# Patient Record
Sex: MALE | Race: BLACK OR AFRICAN AMERICAN | NOT HISPANIC OR LATINO | Employment: PART TIME | URBAN - METROPOLITAN AREA
[De-identification: names, ages, dates, MRNs, and addresses within clinical notes are randomized per-mention and may not be internally consistent; named-entity substitution may affect disease eponyms.]

---

## 2022-06-15 ENCOUNTER — HOSPITAL ENCOUNTER (EMERGENCY)
Facility: CLINIC | Age: 42
Discharge: ANOTHER HOSPITAL | End: 2022-06-16
Attending: EMERGENCY MEDICINE | Admitting: EMERGENCY MEDICINE

## 2022-06-15 DIAGNOSIS — T78.2XXA ANAPHYLAXIS, INITIAL ENCOUNTER: ICD-10-CM

## 2022-06-15 DIAGNOSIS — F41.9 ANXIETY: ICD-10-CM

## 2022-06-15 PROCEDURE — 99285 EMERGENCY DEPT VISIT HI MDM: CPT | Mod: 25

## 2022-06-15 PROCEDURE — 250N000011 HC RX IP 250 OP 636: Performed by: EMERGENCY MEDICINE

## 2022-06-15 PROCEDURE — 96374 THER/PROPH/DIAG INJ IV PUSH: CPT

## 2022-06-15 PROCEDURE — 250N000009 HC RX 250

## 2022-06-15 PROCEDURE — 90791 PSYCH DIAGNOSTIC EVALUATION: CPT

## 2022-06-15 PROCEDURE — C9803 HOPD COVID-19 SPEC COLLECT: HCPCS

## 2022-06-15 PROCEDURE — 96375 TX/PRO/DX INJ NEW DRUG ADDON: CPT

## 2022-06-15 RX ORDER — METHYLPREDNISOLONE SODIUM SUCCINATE 125 MG/2ML
125 INJECTION, POWDER, LYOPHILIZED, FOR SOLUTION INTRAMUSCULAR; INTRAVENOUS ONCE
Status: COMPLETED | OUTPATIENT
Start: 2022-06-15 | End: 2022-06-15

## 2022-06-15 RX ORDER — LORAZEPAM 2 MG/ML
1 INJECTION INTRAMUSCULAR ONCE
Status: COMPLETED | OUTPATIENT
Start: 2022-06-15 | End: 2022-06-15

## 2022-06-15 RX ADMIN — METHYLPREDNISOLONE SODIUM SUCCINATE 125 MG: 125 INJECTION, POWDER, FOR SOLUTION INTRAMUSCULAR; INTRAVENOUS at 22:25

## 2022-06-15 RX ADMIN — EPINEPHRINE 0.3 MG: 1 INJECTION INTRAMUSCULAR; INTRAVENOUS; SUBCUTANEOUS at 22:22

## 2022-06-15 RX ADMIN — LORAZEPAM 1 MG: 2 INJECTION INTRAMUSCULAR; INTRAVENOUS at 23:26

## 2022-06-16 ENCOUNTER — TELEPHONE (OUTPATIENT)
Dept: BEHAVIORAL HEALTH | Facility: CLINIC | Age: 42
End: 2022-06-16

## 2022-06-16 ENCOUNTER — HOSPITAL ENCOUNTER (INPATIENT)
Facility: HOSPITAL | Age: 42
LOS: 1 days | Discharge: NURSING, REHAB OR LONG-TERM CARE | DRG: 885 | End: 2022-06-17
Attending: NURSE PRACTITIONER | Admitting: STUDENT IN AN ORGANIZED HEALTH CARE EDUCATION/TRAINING PROGRAM

## 2022-06-16 VITALS
BODY MASS INDEX: 30.48 KG/M2 | TEMPERATURE: 97.9 F | OXYGEN SATURATION: 99 % | HEART RATE: 130 BPM | HEIGHT: 73 IN | SYSTOLIC BLOOD PRESSURE: 148 MMHG | DIASTOLIC BLOOD PRESSURE: 107 MMHG | RESPIRATION RATE: 18 BRPM | WEIGHT: 230 LBS

## 2022-06-16 PROBLEM — R44.0 AUDITORY HALLUCINATIONS: Status: ACTIVE | Noted: 2022-06-16

## 2022-06-16 LAB
AMPHETAMINES UR QL SCN: ABNORMAL
ANION GAP SERPL CALCULATED.3IONS-SCNC: 14 MMOL/L (ref 5–18)
BARBITURATES UR QL: ABNORMAL
BASOPHILS # BLD AUTO: 0 10E3/UL (ref 0–0.2)
BASOPHILS NFR BLD AUTO: 0 %
BENZODIAZ UR QL: ABNORMAL
BUN SERPL-MCNC: 12 MG/DL (ref 8–22)
CALCIUM SERPL-MCNC: 9.6 MG/DL (ref 8.5–10.5)
CANNABINOIDS UR QL SCN: ABNORMAL
CHLORIDE BLD-SCNC: 100 MMOL/L (ref 98–107)
CO2 SERPL-SCNC: 22 MMOL/L (ref 22–31)
COCAINE UR QL: ABNORMAL
CREAT SERPL-MCNC: 1.36 MG/DL (ref 0.7–1.3)
CREAT UR-MCNC: 147 MG/DL
EOSINOPHIL # BLD AUTO: 0 10E3/UL (ref 0–0.7)
EOSINOPHIL NFR BLD AUTO: 0 %
ERYTHROCYTE [DISTWIDTH] IN BLOOD BY AUTOMATED COUNT: 16.2 % (ref 10–15)
GFR SERPL CREATININE-BSD FRML MDRD: 68 ML/MIN/1.73M2
GLUCOSE BLD-MCNC: 129 MG/DL (ref 70–125)
HCT VFR BLD AUTO: 45.3 % (ref 40–53)
HGB BLD-MCNC: 14.7 G/DL (ref 13.3–17.7)
IMM GRANULOCYTES # BLD: 0.1 10E3/UL
IMM GRANULOCYTES NFR BLD: 1 %
LYMPHOCYTES # BLD AUTO: 1.2 10E3/UL (ref 0.8–5.3)
LYMPHOCYTES NFR BLD AUTO: 9 %
MCH RBC QN AUTO: 27.1 PG (ref 26.5–33)
MCHC RBC AUTO-ENTMCNC: 32.5 G/DL (ref 31.5–36.5)
MCV RBC AUTO: 83 FL (ref 78–100)
MONOCYTES # BLD AUTO: 0.3 10E3/UL (ref 0–1.3)
MONOCYTES NFR BLD AUTO: 2 %
NEUTROPHILS # BLD AUTO: 12.1 10E3/UL (ref 1.6–8.3)
NEUTROPHILS NFR BLD AUTO: 88 %
NRBC # BLD AUTO: 0 10E3/UL
NRBC BLD AUTO-RTO: 0 /100
OPIATES UR QL SCN: ABNORMAL
OXYCODONE UR QL: ABNORMAL
PCP UR QL SCN: ABNORMAL
PLATELET # BLD AUTO: 385 10E3/UL (ref 150–450)
POTASSIUM BLD-SCNC: 4.1 MMOL/L (ref 3.5–5)
RBC # BLD AUTO: 5.43 10E6/UL (ref 4.4–5.9)
SARS-COV-2 RNA RESP QL NAA+PROBE: NEGATIVE
SODIUM SERPL-SCNC: 136 MMOL/L (ref 136–145)
WBC # BLD AUTO: 13.6 10E3/UL (ref 4–11)

## 2022-06-16 PROCEDURE — 85025 COMPLETE CBC W/AUTO DIFF WBC: CPT | Performed by: EMERGENCY MEDICINE

## 2022-06-16 PROCEDURE — U0003 INFECTIOUS AGENT DETECTION BY NUCLEIC ACID (DNA OR RNA); SEVERE ACUTE RESPIRATORY SYNDROME CORONAVIRUS 2 (SARS-COV-2) (CORONAVIRUS DISEASE [COVID-19]), AMPLIFIED PROBE TECHNIQUE, MAKING USE OF HIGH THROUGHPUT TECHNOLOGIES AS DESCRIBED BY CMS-2020-01-R: HCPCS | Performed by: EMERGENCY MEDICINE

## 2022-06-16 PROCEDURE — 250N000013 HC RX MED GY IP 250 OP 250 PS 637: Performed by: NURSE PRACTITIONER

## 2022-06-16 PROCEDURE — 250N000013 HC RX MED GY IP 250 OP 250 PS 637: Performed by: EMERGENCY MEDICINE

## 2022-06-16 PROCEDURE — 36415 COLL VENOUS BLD VENIPUNCTURE: CPT | Performed by: EMERGENCY MEDICINE

## 2022-06-16 PROCEDURE — 80048 BASIC METABOLIC PNL TOTAL CA: CPT | Performed by: EMERGENCY MEDICINE

## 2022-06-16 PROCEDURE — 80307 DRUG TEST PRSMV CHEM ANLYZR: CPT | Performed by: EMERGENCY MEDICINE

## 2022-06-16 PROCEDURE — 124N000001 HC R&B MH

## 2022-06-16 PROCEDURE — 250N000012 HC RX MED GY IP 250 OP 636 PS 637: Performed by: EMERGENCY MEDICINE

## 2022-06-16 RX ORDER — NICOTINE 21 MG/24HR
1 PATCH, TRANSDERMAL 24 HOURS TRANSDERMAL DAILY
Status: DISCONTINUED | OUTPATIENT
Start: 2022-06-17 | End: 2022-06-16

## 2022-06-16 RX ORDER — OLANZAPINE 10 MG/1
10 TABLET ORAL 3 TIMES DAILY PRN
Status: DISCONTINUED | OUTPATIENT
Start: 2022-06-16 | End: 2022-06-17 | Stop reason: HOSPADM

## 2022-06-16 RX ORDER — FOLIC ACID 1 MG/1
1 TABLET ORAL DAILY
Status: DISCONTINUED | OUTPATIENT
Start: 2022-06-16 | End: 2022-06-17 | Stop reason: HOSPADM

## 2022-06-16 RX ORDER — LORAZEPAM 2 MG/ML
1-2 INJECTION INTRAMUSCULAR EVERY 30 MIN PRN
Status: DISCONTINUED | OUTPATIENT
Start: 2022-06-16 | End: 2022-06-17 | Stop reason: HOSPADM

## 2022-06-16 RX ORDER — CLONIDINE HYDROCHLORIDE 0.1 MG/1
0.1 TABLET ORAL 2 TIMES DAILY PRN
Status: DISCONTINUED | OUTPATIENT
Start: 2022-06-16 | End: 2022-06-17 | Stop reason: HOSPADM

## 2022-06-16 RX ORDER — MAGNESIUM HYDROXIDE/ALUMINUM HYDROXICE/SIMETHICONE 120; 1200; 1200 MG/30ML; MG/30ML; MG/30ML
30 SUSPENSION ORAL EVERY 4 HOURS PRN
Status: DISCONTINUED | OUTPATIENT
Start: 2022-06-16 | End: 2022-06-17 | Stop reason: HOSPADM

## 2022-06-16 RX ORDER — HYDROCODONE BITARTRATE AND ACETAMINOPHEN 5; 325 MG/1; MG/1
2 TABLET ORAL ONCE
Status: COMPLETED | OUTPATIENT
Start: 2022-06-16 | End: 2022-06-16

## 2022-06-16 RX ORDER — POLYETHYLENE GLYCOL 3350 17 G
2 POWDER IN PACKET (EA) ORAL
Status: DISCONTINUED | OUTPATIENT
Start: 2022-06-16 | End: 2022-06-17 | Stop reason: HOSPADM

## 2022-06-16 RX ORDER — ACETAMINOPHEN 325 MG/1
650 TABLET ORAL EVERY 4 HOURS PRN
Status: DISCONTINUED | OUTPATIENT
Start: 2022-06-16 | End: 2022-06-16

## 2022-06-16 RX ORDER — LORAZEPAM 1 MG/1
1-2 TABLET ORAL EVERY 30 MIN PRN
Status: DISCONTINUED | OUTPATIENT
Start: 2022-06-16 | End: 2022-06-17 | Stop reason: HOSPADM

## 2022-06-16 RX ORDER — LIDOCAINE 4 G/G
2 PATCH TOPICAL
Status: DISCONTINUED | OUTPATIENT
Start: 2022-06-16 | End: 2022-06-17 | Stop reason: HOSPADM

## 2022-06-16 RX ORDER — EPINEPHRINE 0.3 MG/.3ML
0.3 INJECTION SUBCUTANEOUS
Qty: 1 EACH | Refills: 0 | Status: SHIPPED | OUTPATIENT
Start: 2022-06-16

## 2022-06-16 RX ORDER — AMOXICILLIN 250 MG
1 CAPSULE ORAL 2 TIMES DAILY PRN
Status: DISCONTINUED | OUTPATIENT
Start: 2022-06-16 | End: 2022-06-17 | Stop reason: HOSPADM

## 2022-06-16 RX ORDER — LORAZEPAM 1 MG/1
1 TABLET ORAL ONCE
Status: COMPLETED | OUTPATIENT
Start: 2022-06-16 | End: 2022-06-16

## 2022-06-16 RX ORDER — PREDNISONE 10 MG/1
TABLET ORAL
Qty: 30 TABLET | Refills: 0 | Status: SHIPPED | OUTPATIENT
Start: 2022-06-16 | End: 2022-06-26

## 2022-06-16 RX ORDER — HYDROXYZINE HYDROCHLORIDE 25 MG/1
50 TABLET, FILM COATED ORAL EVERY 4 HOURS PRN
Status: DISCONTINUED | OUTPATIENT
Start: 2022-06-16 | End: 2022-06-17 | Stop reason: HOSPADM

## 2022-06-16 RX ORDER — LANOLIN ALCOHOL/MO/W.PET/CERES
6 CREAM (GRAM) TOPICAL
Status: DISCONTINUED | OUTPATIENT
Start: 2022-06-16 | End: 2022-06-17 | Stop reason: HOSPADM

## 2022-06-16 RX ORDER — FLUMAZENIL 0.1 MG/ML
0.2 INJECTION, SOLUTION INTRAVENOUS
Status: DISCONTINUED | OUTPATIENT
Start: 2022-06-16 | End: 2022-06-17 | Stop reason: HOSPADM

## 2022-06-16 RX ORDER — NICOTINE 21 MG/24HR
1 PATCH, TRANSDERMAL 24 HOURS TRANSDERMAL DAILY
Status: DISCONTINUED | OUTPATIENT
Start: 2022-06-16 | End: 2022-06-17 | Stop reason: HOSPADM

## 2022-06-16 RX ORDER — MULTIPLE VITAMINS W/ MINERALS TAB 9MG-400MCG
1 TAB ORAL DAILY
Status: DISCONTINUED | OUTPATIENT
Start: 2022-06-16 | End: 2022-06-17 | Stop reason: HOSPADM

## 2022-06-16 RX ORDER — OLANZAPINE 10 MG/2ML
10 INJECTION, POWDER, FOR SOLUTION INTRAMUSCULAR 3 TIMES DAILY PRN
Status: DISCONTINUED | OUTPATIENT
Start: 2022-06-16 | End: 2022-06-17 | Stop reason: HOSPADM

## 2022-06-16 RX ORDER — ACETAMINOPHEN 325 MG/1
975 TABLET ORAL EVERY 6 HOURS PRN
Status: DISCONTINUED | OUTPATIENT
Start: 2022-06-16 | End: 2022-06-17 | Stop reason: HOSPADM

## 2022-06-16 RX ADMIN — OLANZAPINE 10 MG: 10 TABLET, FILM COATED ORAL at 21:44

## 2022-06-16 RX ADMIN — LIDOCAINE 2 PATCH: 4 PATCH TOPICAL at 21:53

## 2022-06-16 RX ADMIN — LORAZEPAM 2 MG: 1 TABLET ORAL at 21:44

## 2022-06-16 RX ADMIN — NICOTINE 1 PATCH: 21 PATCH, EXTENDED RELEASE TRANSDERMAL at 22:49

## 2022-06-16 RX ADMIN — HYDROCODONE BITARTRATE AND ACETAMINOPHEN 2 TABLET: 5; 325 TABLET ORAL at 07:48

## 2022-06-16 RX ADMIN — PREDNISONE 50 MG: 20 TABLET ORAL at 07:48

## 2022-06-16 RX ADMIN — LORAZEPAM 1 MG: 1 TABLET ORAL at 02:58

## 2022-06-16 RX ADMIN — ACETAMINOPHEN 975 MG: 325 TABLET ORAL at 21:44

## 2022-06-16 ASSESSMENT — ACTIVITIES OF DAILY LIVING (ADL)
TOILETING_ISSUES: NO
DRESSING/BATHING_DIFFICULTY: NO
WEAR_GLASSES_OR_BLIND: NO
DIFFICULTY_EATING/SWALLOWING: NO
CONCENTRATING,_REMEMBERING_OR_MAKING_DECISIONS_DIFFICULTY: OTHER (SEE COMMENTS)
CHANGE_IN_FUNCTIONAL_STATUS_SINCE_ONSET_OF_CURRENT_ILLNESS/INJURY: NO
FALL_HISTORY_WITHIN_LAST_SIX_MONTHS: NO
WALKING_OR_CLIMBING_STAIRS_DIFFICULTY: NO
DOING_ERRANDS_INDEPENDENTLY_DIFFICULTY: OTHER (SEE COMMENTS)

## 2022-06-16 NOTE — TELEPHONE ENCOUNTER
Inpatient Bed Call Log 6/16/2022 Morning done at 8:06a    Adults:         Not [facility]; Outside [facility] is [facility] beds.     [facility] is posting 0 beds.    [facility] is posting 0 beds.    [facility] is posting 0 beds.    [facility] is posting 0 beds.    [facility] is posting 0 beds.    [facility] is posting 0 beds.    [facility] is posting 0 beds.      [facility] is posting 3 beds. Mixed unit 12+. Low acuity only.     [facility] is positing 0 beds. No aggression.     [facility] is posting 0 beds.     [facility] is posting 0 beds. Low acuity only.    [facility] is posting 0 beds.    [facility] is posting 0 beds. Low acuity.     [facility] is posting 0 beds. 72 hr hold required.     [facility] is posting 6 beds.       [facility] is posting 0 beds. Vol only, No Hx of aggression, violence or assault. No sexual offenders. No 72 hr holds.    [facility] is posting 8 beds. (Must have the cognitive ability to do programming. No aggressive or violent behavior or recent HX in the last 2 yrs. MH must be primary.)    [facility] is posting 0 beds. Low acuity only. Violence and aggression capped.     [facility] is posting 0 beds. Low acuity, Neg Covid.     [facility] is posting 0 beds. Covid neg. Vol only. Combined adolescent and adult unit. No aggressive or violent behavior. No registered sex offenders.     [facility] is posting 6 beds. Call for details.      [facility] is posting 2 beds.    9:30am No appropriate beds available.     R: Patient cleared and ready for behavioral bed placement: Yes    10:34am Intake called [facility] and spoke with pt's nurse (Cheryl). Intake requested CBC and CMP labs for outside placement. Nurse reports pt is calm, cooperative, not aggressive, and is wanting to restart his MH meds.     1:31pm Intake called [facility]  provider April. Pt was accepted for placement at [facility].     1:52pm Intake called [address] and provided disposition to charge nurse (Valarie). Nurse report: Anytime.    1:55pm Intake called [facility] ED and provided placement information to pt's nurse.

## 2022-06-16 NOTE — ED PROVIDER NOTES
EMERGENCY DEPARTMENT ENCOUNTER      NAME: Corbin Sheets  AGE: 41 year old male  YOB: 1980  MRN: [id number]  EVALUATION DATE & TIME: 6/15/2022 10:10 PM    PCP: No primary care provider on file.    ED PROVIDER: Richie Robert D.O.      Chief Complaint   Patient presents with     Allergic Reaction       FINAL IMPRESSION:  1. Anaphylaxis, initial encounter    2. Anxiety        ED COURSE & MEDICAL DECISION MAKING:    10:20 PM I met with the patient to gather history and to perform my initial exam. I discussed the plan for care while in the Emergency Department.  11:21 PM I rechecked patient. Patient's throat is feeling improved. He is still feeling somewhat anxious. Will give patient Ativan for this.   1:11 AM I spoke to DEC .  1:22 AM Patient is now medically cleared for behavioral health admission.         Pertinent Labs & Imaging studies reviewed. (See chart for details)  41 year old male presents to the Emergency Department for evaluation of apparent anaphylactic reaction after taking naproxen this evening.  Patient did have significant edema of the soft palate and uvula on exam which did improve after epinephrine and Solu-Medrol.  The patient did receive Benadryl prior to arriving to the emergency department.  Patient was stable for 3 full hours, and then medically cleared.  He did report that he was having significant anxiety before the onset of the anaphylactic reaction, and after discussion with DEC, we have decided that the patient would benefit from inpatient behavioral health management.  He is voluntary, I do not believe him to be holdable, as I do not believe him to be a significant risk to himself or others, but do believe he would benefit from inpatient management.  At this time the patient is otherwise stable, and will remain in the emergency department until able to find a bed by social work.    At the conclusion of the encounter I discussed the results of all of the tests and the  disposition. The questions were answered. The patient or family acknowledged understanding and was agreeable with the care plan.      CRITICAL CARE:  Critical Care  Performed by: SUPRIYA CISNEROS  Authorized by: SUPRIYA CISNEROS  Total critical care time: 35 minutes  Critical care time was exclusive of separately billable procedures and treating other patients.  Critical care was necessary to treat or prevent imminent or life-threatening deterioration of the following conditions: anaphylaxis  Critical care was time spent personally by me on the following activities: development of treatment plan with patient or surrogate, discussions with consultants, examination of patient, evaluation of patient's response to treatment, obtaining history from patient or surrogate, ordering and performing treatments and interventions, ordering and review of laboratory studies, ordering and review of radiographic studies and re-evaluation of patient's condition, this excludes any separately billable procedures.      HPI    Patient information was obtained from: Patient and Nursing Report    Use of : N/A      Corbin Sheets is a 41 year old male with no pertinent history who presents to the ED via walk-in for the evaluation of allergic reaction.    Per nursing report, patient took 1000 mg Naproxen about an hour ago for the first time. Afterwards,  patient had some chest pain, shortness of breath, oral swelling, and itching. Patient received 50 mg Benadryl orally at a [facility] pharmacy and 25 IM Benadryl en route to ED per EMS. No epinephrine given.    At present, patient reports throat swelling, chest pain, shortness of breath, and itchy sensation to entire body. He also mentions he has felt more anxious lately. Otherwise, he denies any nausea, vomiting, fever, diarrhea, urinary symptoms, and suicidal ideation. He is currently on Ativan for anxiety. He denies any other significant medical history or regular medications. No  "previous surgeries. Patient does smoke and drink. No other complaints at this time.    REVIEW OF SYSTEMS  Constitutional:  Denies fever, chills, weight loss or weakness  Eyes:  No pain, discharge, redness  HENT:  Denies sore throat, ear pain, congestion. Positive for swelling to throat               Respiratory: No wheeze or cough . Positive for shortness of breath  Cardiovascular:  No palpitations. Positive for chest pain  GI:  Denies abdominal pain, nausea, vomiting, diarrhea  : Denies dysuria, hematuria  Musculoskeletal:  Denies any new muscle/joint pain, swelling or loss of function.  Skin:  Denies rash, pallor  Neurologic:  Denies headache, focal weakness or sensory changes  Lymph: Denies swollen nodes   Psychiatric: Positive for feeling anxious               All other systems negative unless noted in HPI.    PAST MEDICAL HISTORY:  History reviewed. No pertinent past medical history.    PAST SURGICAL HISTORY:  History reviewed. No pertinent surgical history.      CURRENT MEDICATIONS:    Current Facility-Administered Medications   Medication     predniSONE (DELTASONE) tablet 50 mg     Current Outpatient Medications   Medication     EPINEPHrine (ANY BX GENERIC EQUIV) 0.3 MG/0.3ML injection 2-pack     predniSONE (DELTASONE) 10 MG tablet         ALLERGIES:  No Known Allergies    FAMILY HISTORY:  History reviewed. No pertinent family history.    SOCIAL HISTORY:       VITALS:  Patient Vitals for the past 24 hrs:   BP Temp Temp src Pulse Resp SpO2 Height Weight   06/15/22 2345 (!) 200/95 -- -- 117 18 99 % -- --   06/15/22 2330 (!) 180/77 -- -- 118 18 98 % -- --   06/15/22 2315 (!) 229/118 -- -- (!) 125 18 98 % -- --   06/15/22 2300 (!) 206/88 -- -- (!) 131 18 98 % -- --   06/15/22 2230 (!) 176/94 -- -- (!) 129 20 98 % -- --   06/15/22 2215 (!) 187/102 -- -- -- -- -- -- --   06/15/22 2211 (!) 200/119 98.3  F (36.8  C) Oral (!) 127 22 99 % 1.854 m (6' 1\") 104.3 kg (230 lb)       PHYSICAL EXAM    VITAL SIGNS: BP (!) " "200/95   Pulse 117   Temp 98.3  F (36.8  C) (Oral)   Resp 18   Ht 1.854 m (6' 1\")   Wt 104.3 kg (230 lb)   SpO2 99%   BMI 30.34 kg/m      General Appearance: Appears anxious, well-nourished, no acute distress   Head:  Normocephalic, without obvious abnormality, atraumatic  Eyes:  PERRL, conjunctiva/corneas clear, EOM's intact,  ENT:  Lips, mucosa, and tongue normal, membranes are moist without pallor. Moderate edema to soft palate and uvula  Neck:  Normal ROM, symmetrical, trachea midline    Cardio:  Regular rate and rhythm, no murmur, rub or gallop, 2+ pulses symmetric in all extremities  Pulm:  Clear to auscultation bilaterally, respirations unlabored,  Abdomen:  Soft, non-tender, no rebound or guarding.  Musculoskeletal: Full ROM, no edema, no cyanosis, good ROM of major joints  Integument:  Warm, Dry, No erythema, No rash.    Neurologic:  Alert & oriented.  No focal deficits appreciated.  Ambulatory.  Psychiatric:  Affect normal, Judgment normal, Mood normal.        LABS  Results for orders placed or performed during the hospital encounter of 06/15/22 (from the past 24 hour(s))   Asymptomatic COVID-19 Virus (Coronavirus) by PCR Nasopharyngeal    Specimen: Nasopharyngeal; Swab   Result Value Ref Range    SARS CoV2 PCR Negative Negative    Narrative    Testing was performed using the Xpert Xpress SARS-CoV-2 Assay on the   Cepheid Gene-Xpert Instrument Systems. Additional information about   this Emergency Use Authorization (EUA) assay can be found via the Lab   Guide. This test should be ordered for the detection of SARS-CoV-2 in   individuals who meet SARS-CoV-2 clinical and/or epidemiological   criteria. Test performance is unknown in asymptomatic patients. This   test is for in vitro diagnostic use under the FDA EUA for   laboratories certified under CLIA to perform high complexity testing.   This test has not been FDA cleared or approved. A negative result   does not rule out the presence of PCR " inhibitors in the specimen or   target RNA in concentration below the limit of detection for the   assay. The possibility of a false negative should be considered if   the patient's recent exposure or clinical presentation suggests   COVID-19. This test was validated by the [facility]. This laboratory is certified under the Clinical Laboratory Improvement Amendments of 1988 (CLIA-88) as qualified to perform high complexity laboratory testing.           RADIOLOGY  No orders to display          MEDICATIONS GIVEN IN THE EMERGENCY:  Medications   predniSONE (DELTASONE) tablet 50 mg (has no administration in time range)   methylPREDNISolone sodium succinate (solu-MEDROL) injection 125 mg (125 mg Intravenous Given 6/15/22 2225)   EPINEPHrine (ADRENALIN) kit 0.3 mg (0.3 mg Intramuscular Given 6/15/22 2222)   LORazepam (ATIVAN) injection 1 mg (1 mg Intravenous Given 6/15/22 2326)   LORazepam (ATIVAN) tablet 1 mg (1 mg Oral Given 6/16/22 0258)       NEW PRESCRIPTIONS STARTED AT TODAY'S ER VISIT  New Prescriptions    EPINEPHRINE (ANY BX GENERIC EQUIV) 0.3 MG/0.3ML INJECTION 2-PACK    Inject 0.3 mLs (0.3 mg) into the muscle once as needed for anaphylaxis    PREDNISONE (DELTASONE) 10 MG TABLET    Take 5 tablets (50 mg) by mouth daily for 2 days, THEN 4 tablets (40 mg) daily for 2 days, THEN 3 tablets (30 mg) daily for 2 days, THEN 2 tablets (20 mg) daily for 2 days, THEN 1 tablet (10 mg) daily for 2 days.             I, Fatmata Balderrama, am serving as a scribe to document services personally performed by Dr. Robert, based on my observations and the provider's statements to me. I, Dr. Robert, attest that Fatmata Balderrama is acting in a scribe capacity, has observed my performance of the services and has documented them in accordance with my direction.      Richie Robert D.O.  Emergency Medicine  [facility] EMERGENCY ROOM  [address]  [address]  [phone]  Dept: [phone]     Richie Robert,   06/16/22 0426

## 2022-06-16 NOTE — SAFE
Corbin Sheets  June 16, 2022  SAFE Note    Critical Safety Issues: Patient denies current or past suicidal thoughts, plan or attempt.       Current Suicidal Ideation/Self-Injurious Concerns/Methods: None - N/A      Current or Historical Inappropriate Sexual Behavior: No      Current or Historical Aggression/Homicidal Ideation: None - N/A      Triggers: None identified.     Guardianship Status: Patient is his own decision maker.     This patient is a child/adolescent: No    This patient has additional special visitor precautions: No    Updated care team: Yes.       For additional details see full LMHP assessment.       Marcela Luz, LP

## 2022-06-16 NOTE — TELEPHONE ENCOUNTER
Patient cleared and ready for behavioral bed placement: Yes   S: 01:17 DEC call, 41/M, [facility] ED, Hallucinations    B: BIB EMS d/t an allergic reaction but then reported he was hearing voices. The voices are insulting and tell him to steal things and do drugs. Hx of depression and anxiety. Pt has been off his psych medication for a few months. Hx of IP admissions in the past but pt could not specify which hospitals. Pt drinks alcohol and smokes marijuana occasionally. Pt denies SI and HI. No known hx of aggression. No reported acute medical concerns. Medically cleared, eating, drinking, ambulating indep    A: Voluntary  Pt was adamant that he does NOT want to seek placement anywhere in [place]. Pt is ok w/ [place] or outside of the [facility] area    BP: 200/95  Covid test and UDS need to be ordered and collected - Called ED @ 01:30 to request    R: Pt placed on work list until appropriate placement is available

## 2022-06-16 NOTE — ED NOTES
ED Behavioral Health Patient Transition of Care Note    Assuming care from:  Richie Robert DO    Brief history:  Patient is a 41 year old male who initially presented with an allergic reaction. This was treated, but while in the ED he admitted having severe anxiety prior to allergic reaction. In the ED, patient began walking around naked in his room and at one point took a bath using his ED room sink. After discussion with DEC it was decided that patient would benefit from inpatient behavioral health management.     Any outstanding medical concerns:  No, medically stable for  admission    Has SW seen the patient:  yes    Is the patient on a hold:  voluntary    Current plan for disposition:  SW attempting to find a bed    Safety concerns:  No, pt has been cooperative    Dietary restrictions:  None, pt can eat and drink ad miles    Have daily medications been ordered:  no daily meds needed    Significant events during shift:           1. Anaphylaxis, initial encounter    2. Anxiety        I, Valarie Mora, am serving as a scribe to document services personally performed by Dr. Giovany Montiel based on my observation and the provider's statements to me. IGiovany MD attest that Valarie Mora is acting in a scribe capacity, has observed my performance of the services and has documented them in accordance with my direction.     Giovany Montiel M.D.  Emergency Medicine  [facility] EMERGENCY ROOM  [address] [address]  [phone]  Dept: [phone]  6/16/2022     Giovany Montiel MD  06/16/22 0838

## 2022-06-16 NOTE — ED TRIAGE NOTES
Pt here via EMS for an allergic reaction 30-45min PTA to naproxen. Pt took 1000mg naproxen for the first time and reports throat swelling, chest pain, SOB and itchiness. Pt received 50mg benadryl orally from pharmacy he was at at [facility]. In ambulance pt received 25mg benadryl IM about 20min PTA. Pt no in acute distress in triage

## 2022-06-16 NOTE — ED NOTES
Pt is talking to the monitor in his room at this time.  Door is open at this time.  Will continue to monitor pt

## 2022-06-16 NOTE — ED NOTES
[facility] ED Mental Health Handoff Note:     Assuming care from: Dr Giovany Montiel    Brief HPI: 41 year old male signed out to me by the above provider. See initial ED Provider note for full details of the presentation.   In brief, patient presented with an allergic reaction. This was treated, but while in the ED he admitted having severe anxiety prior to allergic reaction. In the ED, patient began walking around naked in his room and at one point took a bath using his ED room sink. After discussion with DEC it was decided that patient would benefit from inpatient behavioral health management.      Home meds reviewed and ordered/administered: Yes  Medically stable for inpatient mental health admission: Yes.  Evaluated by mental health: Yes. The recommendation is for inpatient mental health treatment. Bed search in process  Safety concerns: At the time I received sign out, there were no safety concerns.    Hold Status:  Active Orders   N/A         Labs/Imaging:   Results for orders placed or performed during the hospital encounter of 06/15/22 (from the past 24 hour(s))   Asymptomatic COVID-19 Virus (Coronavirus) by PCR Nasopharyngeal     Status: Normal    Collection Time: 06/16/22  1:38 AM    Specimen: Nasopharyngeal; Swab   Result Value Ref Range    SARS CoV2 PCR Negative Negative    Narrative    Testing was performed using the Xpert Xpress SARS-CoV-2 Assay on the   Cepheid Gene-Xpert Instrument Systems. Additional information about   this Emergency Use Authorization (EUA) assay can be found via the Lab   Guide. This test should be ordered for the detection of SARS-CoV-2 in   individuals who meet SARS-CoV-2 clinical and/or epidemiological   criteria. Test performance is unknown in asymptomatic patients. This   test is for in vitro diagnostic use under the FDA EUA for   laboratories certified under CLIA to perform high complexity testing.   This test has not been FDA cleared or approved. A negative result   does  not rule out the presence of PCR inhibitors in the specimen or   target RNA in concentration below the limit of detection for the   assay. The possibility of a false negative should be considered if   the patient's recent exposure or clinical presentation suggests   COVID-19. This test was validated by the [facility]. This laboratory is certified under the Clinical Laboratory Improvement Amendments of 1988 (CLIA-88) as qualified to perform high complexity laboratory testing.     Basic metabolic panel     Status: Abnormal    Collection Time: 06/16/22 11:33 AM   Result Value Ref Range    Sodium 136 136 - 145 mmol/L    Potassium 4.1 3.5 - 5.0 mmol/L    Chloride 100 98 - 107 mmol/L    Carbon Dioxide (CO2) 22 22 - 31 mmol/L    Anion Gap 14 5 - 18 mmol/L    Urea Nitrogen 12 8 - 22 mg/dL    Creatinine 1.36 (H) 0.70 - 1.30 mg/dL    Calcium 9.6 8.5 - 10.5 mg/dL    Glucose 129 (H) 70 - 125 mg/dL    GFR Estimate 68 >60 mL/min/1.73m2   CBC with platelets + differential     Status: Abnormal    Collection Time: 06/16/22 11:33 AM    Narrative    The following orders were created for panel order CBC with platelets + differential.  Procedure                               Abnormality         Status                     ---------                               -----------         ------                     CBC with platelets and d...[[id number]]  Abnormal            Final result                 Please view results for these tests on the individual orders.   CBC with platelets and differential     Status: Abnormal    Collection Time: 06/16/22 11:33 AM   Result Value Ref Range    WBC Count 13.6 (H) 4.0 - 11.0 10e3/uL    RBC Count 5.43 4.40 - 5.90 10e6/uL    Hemoglobin 14.7 13.3 - 17.7 g/dL    Hematocrit 45.3 40.0 - 53.0 %    MCV 83 78 - 100 fL    MCH 27.1 26.5 - 33.0 pg    MCHC 32.5 31.5 - 36.5 g/dL    RDW 16.2 (H) 10.0 - 15.0 %    Platelet Count 385 150 - 450 10e3/uL    % Neutrophils 88 %    %  Lymphocytes 9 %    % Monocytes 2 %    % Eosinophils 0 %    % Basophils 0 %    % Immature Granulocytes 1 %    NRBCs per 100 WBC 0 <1 /100    Absolute Neutrophils 12.1 (H) 1.6 - 8.3 10e3/uL    Absolute Lymphocytes 1.2 0.8 - 5.3 10e3/uL    Absolute Monocytes 0.3 0.0 - 1.3 10e3/uL    Absolute Eosinophils 0.0 0.0 - 0.7 10e3/uL    Absolute Basophils 0.0 0.0 - 0.2 10e3/uL    Absolute Immature Granulocytes 0.1 <=0.4 10e3/uL    Absolute NRBCs 0.0 10e3/uL   Urine Drugs of Abuse Screen Panel 1 - Drug Screen (Full)     Status: Abnormal    Collection Time: 06/16/22  2:01 PM    Narrative    The following orders were created for panel order Urine Drugs of Abuse Screen Panel 1 - Drug Screen (Full).  Procedure                               Abnormality         Status                     ---------                               -----------         ------                     Drugs of Abuse 1 Panel, ...[[id number]]  Abnormal            Final result                 Please view results for these tests on the individual orders.   Drugs of Abuse 1 Panel, Urine ([facility] Only)     Status: Abnormal    Collection Time: 06/16/22  2:01 PM   Result Value Ref Range    Amphetamines Urine Screen Positive (A) Screen Negative    Benzodiazepines Urine Screen Negative Screen Negative    Opiates Urine Screen Positive (A) Screen Negative    PCP Urine Screen Negative Screen Negative    Cannabinoids Urine Screen Negative Screen Negative    Barbiturates Urine Screen Negative Screen Negative    Cocaine Urine Screen Negative Screen Negative    Oxycodone Urine Screen Negative Screen Negative    Creatinine Urine mg/dL 147 mg/dL    Narrative    Drug                  Screening Threshold    Amphetamines           1000 ng/mL  Benzodiazepine          200 ng/mL  Opiates                 300 ng/mL  Phencyclidine            25 ng/mL  THC Metabolite           50 ng/mL  Barbiturates            200 ng/mL  Cocaine Metabolite      150 ng/mL  Oxycodone               100  ng/mL    Screening results are to be used only for medical purposes.  Unconfirmed screening results must not be used for non-  medical purposes.         ED Meds:  Medications   predniSONE (DELTASONE) tablet 50 mg (50 mg Oral Given 6/16/22 0748)   methylPREDNISolone sodium succinate (solu-MEDROL) injection 125 mg (125 mg Intravenous Given 6/15/22 2225)   EPINEPHrine (ADRENALIN) kit 0.3 mg (0.3 mg Intramuscular Given 6/15/22 2222)   LORazepam (ATIVAN) injection 1 mg (1 mg Intravenous Given 6/15/22 2326)   LORazepam (ATIVAN) tablet 1 mg (1 mg Oral Given 6/16/22 0258)   HYDROcodone-acetaminophen (NORCO) 5-325 MG per tablet 2 tablet (2 tablets Oral Given 6/16/22 0748)     No orders to display       ED Course:       There were no significant events during my shift.    Patient was accepted by Dr. Beckford at [facility]  A,EMTALA form was completed and the patient will be transported for Admission    Impression:    ICD-10-CM    1. Anaphylaxis, initial encounter  T78.2XXA    2. Anxiety  F41.9        Plan:    1. Transfer to [facility] inpatient psychiatry    I, Isaac Pinedo, am serving as a scribe to document services personally performed by Dr. Lexx Anton based on my observation and the provider's statements to me. ILexx, DO attest that Isaac Pinedo is acting in a scribe capacity, has observed my performance of the services and has documented them in accordance with my direction.    Lexx Anton DO  [facility] EMERGENCY ROOM  [address] [id number] [phone]                   Lexx Anton DO  06/16/22 1075
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
## 2022-06-17 VITALS
HEART RATE: 99 BPM | DIASTOLIC BLOOD PRESSURE: 72 MMHG | RESPIRATION RATE: 18 BRPM | OXYGEN SATURATION: 98 % | SYSTOLIC BLOOD PRESSURE: 127 MMHG | TEMPERATURE: 97 F

## 2022-06-17 LAB
ANION GAP SERPL CALCULATED.3IONS-SCNC: 14 MMOL/L (ref 3–14)
BUN SERPL-MCNC: 16 MG/DL (ref 7–30)
CALCIUM SERPL-MCNC: 8.8 MG/DL (ref 8.5–10.1)
CHLORIDE BLD-SCNC: 104 MMOL/L (ref 94–109)
CO2 SERPL-SCNC: 20 MMOL/L (ref 20–32)
CREAT SERPL-MCNC: 1.27 MG/DL (ref 0.66–1.25)
GFR SERPL CREATININE-BSD FRML MDRD: 74 ML/MIN/1.73M2
GLUCOSE BLD-MCNC: 140 MG/DL (ref 70–99)
POTASSIUM BLD-SCNC: 3.4 MMOL/L (ref 3.4–5.3)
SODIUM SERPL-SCNC: 138 MMOL/L (ref 133–144)

## 2022-06-17 PROCEDURE — 99222 1ST HOSP IP/OBS MODERATE 55: CPT | Performed by: NURSE PRACTITIONER

## 2022-06-17 PROCEDURE — 36415 COLL VENOUS BLD VENIPUNCTURE: CPT | Performed by: NURSE PRACTITIONER

## 2022-06-17 PROCEDURE — 250N000013 HC RX MED GY IP 250 OP 250 PS 637: Performed by: NURSE PRACTITIONER

## 2022-06-17 PROCEDURE — 80048 BASIC METABOLIC PNL TOTAL CA: CPT | Performed by: NURSE PRACTITIONER

## 2022-06-17 PROCEDURE — 99235 HOSP IP/OBS SAME DATE MOD 70: CPT | Mod: AI | Performed by: NURSE PRACTITIONER

## 2022-06-17 RX ADMIN — FOLIC ACID 1 MG: 1 TABLET ORAL at 08:22

## 2022-06-17 RX ADMIN — MULTIPLE VITAMINS W/ MINERALS TAB 1 TABLET: TAB at 08:22

## 2022-06-17 RX ADMIN — HYDROXYZINE HYDROCHLORIDE 50 MG: 25 TABLET, FILM COATED ORAL at 08:27

## 2022-06-17 RX ADMIN — Medication 100 MG: at 08:23

## 2022-06-17 RX ADMIN — ACETAMINOPHEN 975 MG: 325 TABLET ORAL at 08:24

## 2022-06-17 NOTE — DISCHARGE INSTRUCTIONS
Behavioral Discharge Planning and Instructions    Summary: You were admitted on 6/16/2022  due to Chemical Use Issues.  You were treated by the treatment team and discharged on 6/17/2022 from [facility] to homeless shelter location.    Main Diagnosis:     Health Care Follow-up:     *pt has no insurance coverage at this time and agrees to utilize free-walk-in clinic services in his area*    [facility]- discharging address  [address] [place] [phone]  Open 365 days a year, 5 pm-9 am   Doors open at 5 pm, guests must arrive by 5:30 pm. If you cannot attend the lottery, you can call [phone] by 3:30 pm to have your name entered as a call-in.    [facility]- Free Clinic  [address] [place], [place] - [phone]  9:00 AM - 2:00 PM    [facility]- Free Clinic  [address] [place] [phone]  Monday 8:30 AM - 4:30 PM    [facility]  services Monday through Saturday, 9am to 7pm  Phone: [phone]  [address] [place] [address]  Web: www.chemicalassessmentNobis [employer] Group    M[facility]  [address] [place] [address], [address]  [place], [place] [address]  Phone: [phone]    Walk-In Counseling Center  [address] [place] [address]  Main Phone:[phone]  Fax:[phone]    [facility]  Apply for benefits  We re changing the way we serve you by offering services in a way that is more convenient for you. Apply for economic benefits whenever, wherever. Apply online or call [phone].    To apply for Cash, SNAP,  Assistance or Emergency Assistance, visit: mnb[place]efits.[place].gov.    Homeless shelters in the [place] area:    [facility]  [address] [place], [place]  [phone]    [facility]  [address] [place], [place]  [phone]    [facility]  [address] [place] [address]  S  [place], [place]  [phone]    [facility]  [address]  [place], [place]  [phone]    [facility]  [address]  [place], [place]  [phone]    [facility]  [address] [facility]  [place], [place]  [phone]         Attend all scheduled appointments with your outpatient providers. Call at least 24 hours in advance if you need to reschedule an appointment to ensure continued access to your outpatient providers.     Major Treatments, Procedures and Findings:  You were provided with: a psychiatric assessment, assessed for medical stability, medication evaluation and/or management, and group therapy    Symptoms to Report: feeling more aggressive, increased confusion, losing more sleep, mood getting worse, or thoughts of suicide    Early warning signs can include: increased depression or anxiety sleep disturbances increased thoughts or behaviors of suicide or self-harm  increased unusual thinking, such as paranoia or hearing voices    Safety and Wellness:  Take all medicines as directed.  Make no changes unless your doctor suggests them.      Follow treatment recommendations.  Refrain from alcohol and non-prescribed drugs.  Ask your support system to help you reduce your access to items that could harm yourself or others. If there is a concern for safety, call 911.    Resources:   Crisis Intervention: 777.231.4748 or 331-421-4471 (TTY: 985.270.4313).  Call anytime for help.  [place] on Mental Illness (www.[place].org): 884.288.7830 or 204-056-0558.  [place] Association for Children's Mental Health (www.macmh.org): 382.198.9986.  Alcoholics Anonymous (www.alcoholics-anonymous.org): Check your phone book for your local chapter.  Suicide Awareness Voices of Education (SAVE) (www.save.org): 375-752-NKYP (6024)  National Suicide Prevention Line (www.mentalhealthmn.org): 784-740-KHUQ (7434)  Mental Health Consumer/Survivor  "Network of [place] (www.mhcsn.net): 416-903-6463 or 521-756-7881  Mental Health Association of [place] (www.mentalhealth.org): 408.372.2433 or 842-569-2972  Self- Management and Recovery Training., SMART-- Toll free: 674.344.2504  www.[facility].Facishare  [facility] Crisis Response 530 908-3721  [facility] Crisis Response 156-030-4788  [facility] Crisis Response [phone]  [facility] Crisis (COPE) Response - Adult 558 336-0515  [facility] Crisis Response - Adult 056 625-2738  [facility] Rapid Response 272-289-5939  Text 4 Life: txt \"LIFE\" to [address]2011 for immediate support and crisis intervention  Crisis text line: Text \"[place]\" to 159247. Free, confidential, 24/7.  Crisis Intervention: 529.219.1612 or 674-381-9737. Call anytime for help.   [facility] Mental Health Crisis Team - Child: [phone]  [facility] Crisis Response Team - Child: [phone]  [facility] Mental Health Crisis: 7-150-000-0611   [facility] Mental Health Crisis Team:  [phone]  [facility], and [facility] Crisis Response Team (CRT):  [phone] or [phone]     General Medication Instructions:   See your medication sheet(s) for instructions.   Take all medicines as directed.  Make no changes unless your doctor suggests them.   Go to all your doctor visits.  Be sure to have all your required lab tests. This way, your medicines can be refilled on time.  Do not use any drugs not prescribed by your doctor.  Avoid alcohol.    Advance Directives:   Scanned document on file with [facility]? No scanned doc  Is document scanned? Pt states no documents  Honoring Choices Your Rights Handout: Informed and given  Was more information offered? Pt declined    The Treatment team has appreciated the opportunity to work with you. If you have any questions or concerns about your recent admission, you can contact the unit which can receive your " call 24 hours a day, 7 days a week. They will be able to get in touch with a Provider if needed. The unit number is [phone].
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
## 2022-06-17 NOTE — PLAN OF CARE
Pt is discharging at the recommendation of the treatment team. Pt is discharging to [facility] homeless [facility] transported by [employer]. Per pt report, he is also attempting to find a lady to stay with via dating nanci as an alternative. Pt denies having any thoughts of hurting themself or anyone else. Pt has no follow-ups scheduled, and has no coverage on file. Pt met with patient financial, and he told them he applied for MA already and declined any additional assistance. Free clinic resources in local are listed in pt's AVS to utilize for outpatient services.

## 2022-06-17 NOTE — PHARMACY
Pharmacy destroyed patient's home medication of naproxen per patient care order. Identity of medication was done via drug ID [employer].     Risa Louise PharmD on 6/17/2022

## 2022-06-17 NOTE — PLAN OF CARE
Problem: Behavioral Health Plan of Care  Goal: Patient-Specific Goal (Individualization)  Description: PT will report decrease in auditory hallucinations   PT will attend 50% or greater of groups   PT will work with treatment team on medications and recommendations.         Outcome: Ongoing, Progressing     Face to face shift report received from Jo Ann ROBERTS. Rounding completed, pt observed.     Pt awake at the beginning of this shift. Pt appeared to sleep after 0045 rounds. Pt awake and out in lobby for snack at 0530.    Face to face report will be communicated to oncoming RN.    Valarie Loiuse RN  6/17/2022  5:46 AM

## 2022-06-17 NOTE — PROGRESS NOTES
06/16/22 2051   Patient Belongings   Did you bring any home meds/supplements to the hospital?  Yes   Disposition of meds  Sent to security/pharmacy per site process  (gave to nurse)   Patient Belongings locker   Patient Belongings Put in Hospital Secure Location (Security or Locker, etc.) clothing;cell phone/electronics   Belongings Search Yes   Clothing Search Yes   Second Staff Fani   Comment penny sweat pants, grey boxers, 2 black sandals, white tank top, blue pen, 1 piece of paperwork       List items sent to safe:   PROTEGO Galaxy (no cracks) with black case      All other belongings put in assigned cubby in belongings room.       I have reviewed my belongings list on admission and verify that it is correct.     Patient signature_______________________________    Second staff witness (if patient unable to sign) ______________________________       I have received all my belongings at discharge.    Patient signature________________________________    Flory  6/16/2022  8:59 PM
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
## 2022-06-17 NOTE — H&P
"[facility]    History and Physical  Medical Services       Date of Admission:  6/16/2022  Date of Service (when I saw the patient): 06/17/22    Assessment & Plan     Principal Problem:    Auditory hallucinations    Active Medical Problems:  Left shoulder pain- pt reports he was helping move an aquarium and almost dropped the aquarium. Pt is focused on receiving narcotic pain medications and declined the offer of nonopioid analgesics. Had to redirect pt multiple times due to pt repeatedly asking for \"dilaudid  and hydrocodone\" . Pt is propped up in bed on his left arm and has full range of motion. No obvious abnormality noted. Pt was given a dose of hydrocodone in the ED. Tylenol ordered prn. He will need to follow up with PCP at discharge. Pt verbalized understanding.     Elevated blood pressure- bp on admission was 186/109. He denies hx of HTN or previously taking medications for blood pressure. Clonidine prn ordered. Bp improved this morning 147/92. Pt denies chest pain, sob, difficulty breathing.     Acute kidney injury- Creatinine in the ED 1.36. Likely related to substance abuse. UDS positive for amphetamines. Will recheck kidney function today. Encouraged to increase water intake. Avoid NSAIDS.         Code Status: Full Code    Kellen Pastrana, CNP    Primary Care Physician   Physician No Ref-Primary    Chief Complaint   Psych evaluation     History is obtained from the patient and medical chart     History of Present Illness   (per intake) Corbin Sheets is a 41 year old male who presents to [facility] ED. BIB EMS d/t an allergic reaction but then reported he was hearing voices. The voices are insulting and tell him to steal things and do drugs. Hx of depression and anxiety. Pt has been off his psych medication for a few months. Hx of [facility] admissions in the past but pt could not specify which hospitals. Pt drinks alcohol and smokes marijuana occasionally. Pt denies SI and HI. No known hx of aggression. " No reported acute medical concerns. Medically cleared, eating, drinking, ambulating indep    Past Medical History    I have reviewed this patient's medical history and updated it with pertinent information if needed.   No past medical history on file.    Past Surgical History   I have reviewed this patient's surgical history and updated it with pertinent information if needed.  No past surgical history on file.    Prior to Admission Medications   Prior to Admission Medications   Prescriptions Last Dose Informant Patient Reported? Taking?   EPINEPHrine (ANY BX GENERIC EQUIV) 0.3 MG/0.3ML injection 2-pack   No No   Sig: Inject 0.3 mLs (0.3 mg) into the muscle once as needed for anaphylaxis   predniSONE (DELTASONE) 10 MG tablet   No No   Sig: Take 5 tablets (50 mg) by mouth daily for 2 days, THEN 4 tablets (40 mg) daily for 2 days, THEN 3 tablets (30 mg) daily for 2 days, THEN 2 tablets (20 mg) daily for 2 days, THEN 1 tablet (10 mg) daily for 2 days.      Facility-Administered Medications: None     Allergies   Allergies   Allergen Reactions     Naproxen Anaphylaxis       Social History   I have reviewed this patient's social history and updated it with pertinent information if needed. Corbin Sheets      Family History   I have reviewed this patient's family history and updated it with pertinent information if needed.   No family history on file.    Review of Systems   CONSTITUTIONAL:  negative  EYES:  negative  HEENT:  negative  RESPIRATORY:  negative  CARDIOVASCULAR:  negative  GASTROINTESTINAL:  negative  GENITOURINARY:  negative  INTEGUMENT/BREAST:  negative  HEMATOLOGIC/LYMPHATIC:  negative  ALLERGIC/IMMUNOLOGIC:  negative  ENDOCRINE:  negative  MUSCULOSKELETAL:  negative  NEUROLOGICAL:  negative    Physical Exam   Temp: 97.1  F (36.2  C) Temp src: Tympanic BP: 147/92 Pulse: 106   Resp: 16 SpO2: 100 % O2 Device: None (Room air)    Vital Signs with Ranges  Temp:  [97.1  F (36.2  C)-99.1  F (37.3  C)] 97.1  F (36.2   C)  Pulse:  [106-130] 106  Resp:  [16-20] 16  BP: (147-186)/() 147/92  SpO2:  [99 %-100 %] 100 %  0 lbs 0 oz    Constitutional: awake, alert, cooperative, no apparent distress, and appears stated age  Eyes: Lids and lashes normal, pupils equal, round and reactive to light, extra ocular muscles intact, sclera clear, conjunctiva normal  ENT: Normocephalic, without obvious abnormality, atraumatic, external ears without lesions, oral pharynx with moist mucous membranes, no erythema or exudates  Hematologic / Lymphatic: no cervical lymphadenopathy  Respiratory: No increased work of breathing, good air exchange, clear to auscultation bilaterally, no crackles or wheezing  Cardiovascular: Normal apical impulse, regular rate and rhythm, normal S1 and S2, no S3 or S4, and no murmur noted  GI: normal bowel sounds, soft, non-distended, non-tender, no masses palpated, no hepatosplenomegally  Genitounirinary: deferred  Skin: normal skin color, texture, turgor and no redness, warmth, or swelling  Musculoskeletal: There is no redness, warmth, or swelling of the joints.  Full range of motion noted.  Motor strength is 5 out of 5 all extremities bilaterally.  Tone is normal.  Neurologic: Awake, alert, oriented to name, place and time.    Neuropsychiatric: General:  perseverating on pain medications, calm and normal eye contact    Data   Data reviewed today:   Recent Labs   Lab 06/16/22  1133   WBC 13.6*   HGB 14.7   MCV 83         POTASSIUM 4.1   CHLORIDE 100   CO2 22   BUN 12   CR 1.36*   ANIONGAP 14   SERAFIN 9.6   *       No results found for this or any previous visit (from the past 24 hour(s)).

## 2022-06-17 NOTE — PLAN OF CARE
ADMISSION NOTE    Reason for admission Voluntary to restart medications for Auditory Hallucinations   Safety concerns none  Risk for or history of violence unknown   Full skin assessment: Completed     Patient arrived on unit from [facility] accompanied by ambulance transport and facility security on 6/16/2022  19:56PM.   Status on arrival: calm,cooperative   BP (!) 186/109   Pulse 120   Temp 99.1  F (37.3  C) (Temporal)   Resp 20   SpO2 100%   Patient given tour of unit and Welcome to  unit papers given to patient, wanding completed, belongings inventoried, and admission assessment completed.   Patient's legal status on arrival is voluntary. Appropriate legal rights discussed with and copy given to patient. Patient Bill of Rights discussed with and copy given to patient.   Patient denies SI, HI, and thoughts of self harm and contracts for safety while on unit.      Jo Ann Hampton RN  6/16/2022  11:03 PM    PT arrived from [place]. Transport reported the patient had a conversation with himself throughout the ride. PT reported to me he does hear and talk to voices and sometimes sees people that are not really there. PT reports he would like to restart his medications. PT cannot recall his pharmacy, or any past medical hospitalizations. PT reports to me that he gets his medication from a destin who does work from his house.    PT c/o 9/10 shoulder pain from dropping a fish aquarium. Requesting Hydrocodone for this which he received at previous ED this morning.   PT reporting he takes 800 mg gabapentin but unable to report who his prescriber is.     PT is cooperative and redirectable. He appears somewhat hypersexual at times, asking about female patients and staff, touching genital area at times and during admission questioning. He has been educated on appropriate unit rules and interactions and reports that he does understand this, he has been cooperative.    PT reporting to me he drinks a gallon of alcohol  daily to weekly and has a seizure history with withdrawal. I am unable verify this through medical charts and patient was unable to tell me when this happened or at which hospital.     Lidocaine patches placed one to left shoulder and one to lower back for pain.     PT given Tylenol.     PT given Zyprexa 10 for AH/VH and PT does appear to be responding and preoccupied at times.     PT given 2mg Ativan per CIWA. Score 13.   High BP, high anxiety, voices and visual hallucinations (unknown if these ah/vh are from mental health hx or withdrawal). No visible sweat, no tremors.     PT had high BP on arrival and in ER  151/76 at 2300.                 Face to face end of shift report communicated to oncoming RN     Jo Ann Hampton RN  6/16/2022  11:17 PM                Problem: Behavioral Health Plan of Care  Goal: Patient-Specific Goal (Individualization)  Description: PT will report decrease in auditory hallucinations   PT will attend 50% or greater of groups   PT will work with treatment team on medications and recommendations.         Outcome: Ongoing, Progressing   Goal Outcome Evaluation:

## 2022-06-17 NOTE — H&P
"[facility] PSYCHIATRY   HISTORY AND PHYSICAL/DISCHARGE SUMMARY     ADMISSION/DISCHARGE DATA     Corbin Sheets MRN# [id number]   Age: 41 year old YOB: 1980     Date of Admission: 6/16/2022  Date of Discharge: June 17, 2022  Discharge Provider: VISHNU Ozuna CNP       CHIEF COMPLAINT   \"I want to discharge.\"       HISTORY OF PRESENT ILLNESS   I found Corbin in the lounge eating lunch today. He was not interested in discussing his mental health with me and asked if he could use FB messenger on his phone to find somewhere to stay because he wants to leave and \"that's how I communicate with people because sometimes I don't have minutes on my phone.\" It should be noted he met with VIRGIL Alcocer earlier today requesting Dilaudid and Hydrocodone for some shoulder pain and when he was informed those medications wouldn't be prescribed for him here, he told his nurse he wanted to discharge.     I did tell Corbin that all we needed from him was an address to send him to and we would work on arranging transportation. Apparently, he and staff went into the report room for 30 minutes while he was on his phone on FB, trying to get numbers from people. When limits were set on this and concerns about him being on a dating nanci were brought to his attention, he became agitated and accused staff of \"rushing\" him. He requested to speak with me again and stated that he doesn't want to go back to the [facility] because returning there is \"setting me up for failure.\" He states he \"ended up\" in [place] from [place] and is trying to stay off drugs. He does not provide any information about his hx or how he ended up in [place] to begin with. He began demanding that we find him somewhere better to go. The patient doesn't have any health insurance, so facilitating placement elsewhere is not feasible. I told him that there was no clinical indication for him to be admitted to our unit (patient denied suicidal or homicidal " "ideation) and the only complaint he offered is that he was struggling to stay off drugs.     After Corbin discharged, another patient on the unit approached nursing and disclosed that he had been offering people drugs while on the unit. This patient reports Corbin \"cheeked\" a baggie full of white residue and had been hiding it in his mouth while here.      PSYCHIATRIC HISTORY   Patient refused to discuss this information and there is no hx in our system or  to reach out to for collateral.        SUBSTANCE USE HISTORY   History   Drug Use Not on file       Social History    Substance and Sexual Activity      Alcohol use: Not on file      History   Smoking Status     Not on file   Smokeless Tobacco     Not on file     Unable to assess. Patient declined to discuss this with me.        SOCIAL HISTORY   Social History     Socioeconomic History     Marital status: Single     Spouse name: Not on file     Number of children: Not on file     Years of education: Not on file     Highest education level: Not on file   Occupational History     Not on file   Tobacco Use     Smoking status: Not on file     Smokeless tobacco: Not on file   Substance and Sexual Activity     Alcohol use: Not on file     Drug use: Not on file     Sexual activity: Not on file   Other Topics Concern     Not on file   Social History Narrative     Not on file     Social Determinants of Health     Financial Resource Strain: Not on file   Food Insecurity: Not on file   Transportation Needs: Not on file   Physical Activity: Not on file   Stress: Not on file   Social Connections: Not on file   Intimate Partner Violence: Not on file   Housing Stability: Not on file       Unable to assess. Patient declined to discuss this with me.      FAMILY HISTORY   No family history on file.      PAST MEDICAL HISTORY   No past medical history on file.    No past surgical history on file.    Naproxen     MEDICATIONS   Prior to Admission medications    Medication " Sig Start Date End Date Taking? Authorizing Provider   EPINEPHrine (ANY BX GENERIC EQUIV) 0.3 MG/0.3ML injection 2-pack Inject 0.3 mLs (0.3 mg) into the muscle once as needed for anaphylaxis 6/16/22   Richie Robert, DO   predniSONE (DELTASONE) 10 MG tablet Take 5 tablets (50 mg) by mouth daily for 2 days, THEN 4 tablets (40 mg) daily for 2 days, THEN 3 tablets (30 mg) daily for 2 days, THEN 2 tablets (20 mg) daily for 2 days, THEN 1 tablet (10 mg) daily for 2 days. 6/16/22 6/26/22  Richie Robert, DO        PHYSICAL EXAM/ROS   I have reviewed the physical exam as documented by the medical team and agree with findings and assessment and have no additional findings to add at this time. The review of systems is negative other than noted in the HPI.       LABS   Recent Results (from the past 24 hour(s))   Drugs of Abuse 1 Panel, Urine ([facility] Only)    Collection Time: 06/16/22  2:01 PM   Result Value Ref Range    Amphetamines Urine Screen Positive (A) Screen Negative    Benzodiazepines Urine Screen Negative Screen Negative    Opiates Urine Screen Positive (A) Screen Negative    PCP Urine Screen Negative Screen Negative    Cannabinoids Urine Screen Negative Screen Negative    Barbiturates Urine Screen Negative Screen Negative    Cocaine Urine Screen Negative Screen Negative    Oxycodone Urine Screen Negative Screen Negative    Creatinine Urine mg/dL 147 mg/dL   Basic metabolic panel    Collection Time: 06/17/22 10:32 AM   Result Value Ref Range    Sodium 138 133 - 144 mmol/L    Potassium 3.4 3.4 - 5.3 mmol/L    Chloride 104 94 - 109 mmol/L    Carbon Dioxide (CO2) 20 20 - 32 mmol/L    Anion Gap 14 3 - 14 mmol/L    Urea Nitrogen 16 7 - 30 mg/dL    Creatinine 1.27 (H) 0.66 - 1.25 mg/dL    Calcium 8.8 8.5 - 10.1 mg/dL    Glucose 140 (H) 70 - 99 mg/dL    GFR Estimate 74 >60 mL/min/1.73m2         MENTAL STATUS EXAM   Vitals: /72 (BP Location: Left arm)   Pulse 99   Temp 97  F (36.1  C) (Temporal)   Resp 18  "  SpO2 98%     Appearance: Alert, oriented, dressed in hospital scrubs  Attitude: guarded and evasive  Eye Contact: intense  Mood: \"I want to leave\"  Affect: irritable  Speech: Normal range. Normal rhythm   Psychomotor Behavior: No tremor, rigidity, akathisia, or psychomotor retardation    Thought Process: Logical, goal directed   Associations: No loose associations   Thought Content: Denies SI. No SIB. Denies AVH. No evidence of delusional thought  Insight: limited  Judgment: limited  Oriented to: Person, place, and time  Attention Span and Concentration: Intact  Recent and Remote Memory: Intact  Language: English with appropriate syntax and vocabulary  Fund of Knowledge: Average  Muscle Strength and Tone: Grossly normal  Gait and Station: Grossly normal       ASSESSMENT     This is a 41 year old male with a PMH of depression, anxiety, and polysubstance abuse who presented to the [facility] ED c/o anaphylaxis related to an allergic reaction to naproxen. After being treated, he reported anxiety and made some statements about how he didn't feel like he could keep himself safe outside of the hospital. Patient refused to participate in any sort of meaningful assessment while here, instead blaming us for not doing anything to help him.         DIAGNOSES     1.Unspecified mood disorder  2.Polysubstance abuse - amphetamines, crack-cocaine  3.Malingering       CONSULTS     None       PLAN/HOSPITAL COURSE     Legal status: Orders Placed This Encounter      Voluntary    Patient was admitted to unit 5 due to the aforementioned presentation. The patient was placed under 15 minute checks to ensure patient safety. The patient participated in unit programming and groups as able.    The following changes to the patient's psychiatric medications were made:    The patient was unable to give us the name of the pharmacy he had been using prior to coming to [place] or the names of any of the providers he had been seeing.     Corbin was not " "willing to engage in a meaningful psychiatric examination, nor did he express any intent to harm himself or others. As a result, Cobrin Sheets was discharged. At the time of discharge, Corbin Sheets was determined to not be a danger to self or others. At the current time of discharge, the patient does not meet criteria for involuntary hospitalization. On the day of discharge, the patient reports that they do not have suicidal or homicidal ideation. Steps taken to minimize risk include: assessing patient s behavior and thought process daily during hospital stay, discharging patient with adequate plan for follow up for mental and physical health and discussing safety plan of returning to the hospital should the patient ever have thoughts of harming themselves or others. Therefore, based on all available evidence including the factors cited above, the patient does not appear to be at imminent risk for self-harm, and is appropriate for outpatient level of care. However, if patient uses substances or is medication non-adherent, their risk of decompensation and SI will be elevated. This was discussed with the patient.       TREATMENT TEAM CARE PLAN     Progress: No change. Patient states he \"didn't want to come here in the first place. The ED doctors told me I had to come here!\"    Continued Stay Criteria/Rationale: Discharge today.    Medical/Physical: No acute issues.    Precautions:     Behavioral Orders   Procedures     Code 1 - Restrict to Unit     Routine Programming     As clinically indicated     Status 15     Every 15 minutes.        Plan: Discharge    Rationale for change in precautions or plan: Discharge.    Participants: VISHNU Ozuna CNP, Nursing, SW, OT.    The patient's care was discussed with the treatment team and chart notes were reviewed.       DISCHARGE MEDICATIONS     Current Discharge Medication List      CONTINUE these medications which have NOT CHANGED    Details   EPINEPHrine (ANY BX " GENERIC EQUIV) 0.3 MG/0.3ML injection 2-pack Inject 0.3 mLs (0.3 mg) into the muscle once as needed for anaphylaxis  Qty: 1 each, Refills: 0      predniSONE (DELTASONE) 10 MG tablet Take 5 tablets (50 mg) by mouth daily for 2 days, THEN 4 tablets (40 mg) daily for 2 days, THEN 3 tablets (30 mg) daily for 2 days, THEN 2 tablets (20 mg) daily for 2 days, THEN 1 tablet (10 mg) daily for 2 days.  Qty: 30 tablet, Refills: 0                DISCHARGE PLAN     1.  Education given regarding diagnostic and treatment options with risks, benefits and alternatives with adequate verbalization of understanding.  2.  Discharge via taxi to a homeless shelter in the [facility] area. Upon detailed review of risk factors, patient amenable for release.   3.  Continue aforementioned medications and associated medication changes with follow-up by outpatient provider.  4.  Crisis management planning in place.    5.  Nursing and  to review further discharge recommendations.   6.  Patient is being discharged to home with the following appointments:    Patient does not have any outpatient providers or services set-up. He is homeless, recently found his way to [place] from [place], and doesn't have health insurance.        ADMISSION/DISCHARGE SERVICES PROVIDED     45 minutes spent on discharge services, including:  Final examination of patient.  Review and discussion of hospital stay.  Instructions for continued outpatient care/goals.  Preparation of discharge records.  Preparation of medications refills and new prescriptions.  Preparation of applicable referral forms.        ATTESTATION      VISHNU Ozuna CNP